# Patient Record
Sex: FEMALE | Race: WHITE | NOT HISPANIC OR LATINO
[De-identification: names, ages, dates, MRNs, and addresses within clinical notes are randomized per-mention and may not be internally consistent; named-entity substitution may affect disease eponyms.]

---

## 2023-02-02 PROBLEM — Z00.00 ENCOUNTER FOR PREVENTIVE HEALTH EXAMINATION: Status: ACTIVE | Noted: 2023-02-02

## 2023-02-17 ENCOUNTER — APPOINTMENT (OUTPATIENT)
Dept: CARDIOLOGY | Facility: CLINIC | Age: 73
End: 2023-02-17
Payer: MEDICAID

## 2023-02-17 VITALS
SYSTOLIC BLOOD PRESSURE: 124 MMHG | TEMPERATURE: 97.8 F | DIASTOLIC BLOOD PRESSURE: 70 MMHG | BODY MASS INDEX: 1.7 KG/M2 | HEIGHT: 63 IN | WEIGHT: 9.56 LBS | HEART RATE: 54 BPM | RESPIRATION RATE: 14 BRPM

## 2023-02-17 DIAGNOSIS — Z78.9 OTHER SPECIFIED HEALTH STATUS: ICD-10-CM

## 2023-02-17 DIAGNOSIS — Z82.49 FAMILY HISTORY OF ISCHEMIC HEART DISEASE AND OTHER DISEASES OF THE CIRCULATORY SYSTEM: ICD-10-CM

## 2023-02-17 PROCEDURE — 93000 ELECTROCARDIOGRAM COMPLETE: CPT

## 2023-02-17 PROCEDURE — 99204 OFFICE O/P NEW MOD 45 MIN: CPT

## 2023-02-18 PROBLEM — Z78.9 SOCIAL ALCOHOL USE: Status: ACTIVE | Noted: 2023-02-17

## 2023-02-18 NOTE — ASSESSMENT
[FreeTextEntry1] : 72 YOF\par HTN controlled with medications from [facility].\par HLD uncontrolled.\par Episodes of chest discomfort.\par \par Plan:\par Start Rosuvastatin 10 mg daily.\par Replace current ACE with Lisinopril 10 mg daily.\par Continue Nebivolol 2.5 mg daily.\par Exercise stress ECHO.\par Follow up after the test.\par \par Celestino Roy MD\par

## 2023-02-18 NOTE — ASSESSMENT
[FreeTextEntry1] : 72 YOF\par HTN controlled with medications from [facility] ([facility]par HLD uncontrolled.\par Episodes of chest discomfort.\par \par Plan:\par Start Rosuvastatin 10 mg daily.\par Replace current ACE with Lisinopril 10 mg daily.\par Continue Nebivolol 2.5 mg daily.\par Exercise stress ECHO.\par Follow up after the test.\par \par Celestino Roy MD\par

## 2023-02-18 NOTE — REVIEW OF SYSTEMS
[Blurry Vision] : no blurred vision [Dyspnea on exertion] : not dyspnea during exertion [Chest Discomfort] : chest discomfort [Lower Ext Edema] : no extremity edema [Leg Claudication] : no intermittent leg claudication [Palpitations] : no palpitations [Syncope] : no syncope [Anxiety] : no anxiety [Negative] : Neurological

## 2023-02-18 NOTE — HISTORY OF PRESENT ILLNESS
[FreeTextEntry1] : 72 YOF with PMHx of HTN, HLD, presents for a cardiac evaluation to establish care.\par \par She has episodes of left-sided chest pressure and SOB on emotional stress, relived when she calms down. She takes daily walks.\par \par .

## 2023-02-21 ENCOUNTER — RESULT CHARGE (OUTPATIENT)
Age: 73
End: 2023-02-21

## 2023-02-27 ENCOUNTER — APPOINTMENT (OUTPATIENT)
Dept: CARDIOLOGY | Facility: CLINIC | Age: 73
End: 2023-02-27

## 2023-03-27 ENCOUNTER — APPOINTMENT (OUTPATIENT)
Dept: CARDIOLOGY | Facility: CLINIC | Age: 73
End: 2023-03-27
Payer: MEDICAID

## 2023-03-27 DIAGNOSIS — R07.89 OTHER CHEST PAIN: ICD-10-CM

## 2023-03-27 PROCEDURE — 93320 DOPPLER ECHO COMPLETE: CPT

## 2023-03-27 PROCEDURE — 93351 STRESS TTE COMPLETE: CPT

## 2023-03-27 PROCEDURE — 93325 DOPPLER ECHO COLOR FLOW MAPG: CPT

## 2023-04-01 PROBLEM — R07.89 CHEST DISCOMFORT: Status: ACTIVE | Noted: 2023-02-17

## 2023-04-18 ENCOUNTER — RESULT CHARGE (OUTPATIENT)
Age: 73
End: 2023-04-18

## 2023-04-18 ENCOUNTER — APPOINTMENT (OUTPATIENT)
Dept: CARDIOLOGY | Facility: CLINIC | Age: 73
End: 2023-04-18
Payer: MEDICAID

## 2023-04-18 VITALS
SYSTOLIC BLOOD PRESSURE: 144 MMHG | HEIGHT: 63 IN | DIASTOLIC BLOOD PRESSURE: 70 MMHG | RESPIRATION RATE: 14 BRPM | HEART RATE: 52 BPM | BODY MASS INDEX: 24.45 KG/M2 | WEIGHT: 138 LBS

## 2023-04-18 DIAGNOSIS — E78.5 HYPERLIPIDEMIA, UNSPECIFIED: ICD-10-CM

## 2023-04-18 DIAGNOSIS — I10 ESSENTIAL (PRIMARY) HYPERTENSION: ICD-10-CM

## 2023-04-18 PROCEDURE — 93000 ELECTROCARDIOGRAM COMPLETE: CPT

## 2023-04-18 PROCEDURE — 99213 OFFICE O/P EST LOW 20 MIN: CPT

## 2023-04-18 RX ORDER — NEBIVOLOL 2.5 MG/1
2.5 TABLET ORAL DAILY
Qty: 90 | Refills: 1 | Status: ACTIVE | COMMUNITY
Start: 1900-01-01 | End: 1900-01-01

## 2023-04-18 RX ORDER — LISINOPRIL 10 MG/1
10 TABLET ORAL DAILY
Qty: 90 | Refills: 3 | Status: DISCONTINUED | COMMUNITY
Start: 2023-02-17 | End: 2023-04-18

## 2023-04-18 RX ORDER — ASPIRIN 81 MG/1
81 TABLET ORAL DAILY
Refills: 0 | Status: ACTIVE | COMMUNITY

## 2023-04-18 NOTE — ASSESSMENT
[FreeTextEntry1] : 72 YOF\par HTN still controlled.\par HLD.\par Normal LVEF, no evidence of CAD. Mild MR, TR, AI.\par \par \par Plan:\par Continue Rosuvastatin 10 mg daily.\par Add HCTZ 12.5 mg to Lisinopril 10 mg daily.\par Continue Nebivolol 2.5 mg daily.\par Follow up with PCP.\par \par \par Celestino Roy MD\par

## 2023-04-18 NOTE — HISTORY OF PRESENT ILLNESS
[FreeTextEntry1] : 72 YOF with PMHx of HTN, HLD\par \par Pt presents for a follow up. Denies chest pain now. She still takes daily walks.\par She reports fluctuations in BP with weather changes. \par \par Exercise stress ECHO.\par METS 7.7\par Normal stress ECHO with normal augmentation of LV systolic function. \par Normal LVEF\par Mild MR, TR, AI.

## 2023-04-18 NOTE — REVIEW OF SYSTEMS
[Negative] : Neurological [Blurry Vision] : no blurred vision [Dyspnea on exertion] : not dyspnea during exertion [Chest Discomfort] : no chest discomfort [Lower Ext Edema] : no extremity edema [Leg Claudication] : no intermittent leg claudication [Palpitations] : no palpitations [Syncope] : no syncope [Anxiety] : no anxiety

## 2023-09-18 RX ORDER — ASPIRIN ENTERIC COATED TABLETS 81 MG 81 MG/1
81 TABLET, DELAYED RELEASE ORAL DAILY
Qty: 90 | Refills: 1 | Status: ACTIVE | COMMUNITY
Start: 2023-09-18 | End: 1900-01-01

## 2023-12-14 ENCOUNTER — RX RENEWAL (OUTPATIENT)
Age: 73
End: 2023-12-14

## 2023-12-14 RX ORDER — ROSUVASTATIN CALCIUM 10 MG/1
10 TABLET, FILM COATED ORAL
Qty: 90 | Refills: 2 | Status: ACTIVE | COMMUNITY
Start: 2023-02-17 | End: 1900-01-01

## 2024-01-20 ENCOUNTER — RX RENEWAL (OUTPATIENT)
Age: 74
End: 2024-01-20

## 2024-01-20 RX ORDER — LISINOPRIL AND HYDROCHLOROTHIAZIDE TABLETS 10; 12.5 MG/1; MG/1
10-12.5 TABLET ORAL DAILY
Qty: 90 | Refills: 0 | Status: ACTIVE | COMMUNITY
Start: 2023-04-18 | End: 1900-01-01